# Patient Record
Sex: FEMALE | Race: NATIVE HAWAIIAN OR OTHER PACIFIC ISLANDER | HISPANIC OR LATINO | Employment: UNEMPLOYED | URBAN - METROPOLITAN AREA
[De-identification: names, ages, dates, MRNs, and addresses within clinical notes are randomized per-mention and may not be internally consistent; named-entity substitution may affect disease eponyms.]

---

## 2020-04-06 ENCOUNTER — APPOINTMENT (EMERGENCY)
Dept: RADIOLOGY | Facility: HOSPITAL | Age: 35
End: 2020-04-06
Payer: OTHER GOVERNMENT

## 2020-04-06 ENCOUNTER — HOSPITAL ENCOUNTER (EMERGENCY)
Facility: HOSPITAL | Age: 35
Discharge: HOME | End: 2020-04-06
Attending: EMERGENCY MEDICINE | Admitting: EMERGENCY MEDICINE
Payer: OTHER GOVERNMENT

## 2020-04-06 VITALS
DIASTOLIC BLOOD PRESSURE: 85 MMHG | RESPIRATION RATE: 18 BRPM | TEMPERATURE: 99.8 F | SYSTOLIC BLOOD PRESSURE: 143 MMHG | HEART RATE: 85 BPM | OXYGEN SATURATION: 99 %

## 2020-04-06 DIAGNOSIS — Z20.822 SUSPECTED COVID-19 VIRUS INFECTION: ICD-10-CM

## 2020-04-06 DIAGNOSIS — B34.9 ACUTE VIRAL SYNDROME: Primary | ICD-10-CM

## 2020-04-06 PROCEDURE — 99283 EMERGENCY DEPT VISIT LOW MDM: CPT

## 2020-04-06 PROCEDURE — 87635 SARS-COV-2 COVID-19 AMP PRB: CPT | Performed by: EMERGENCY MEDICINE

## 2020-04-06 PROCEDURE — 71045 X-RAY EXAM CHEST 1 VIEW: CPT

## 2020-04-06 PROCEDURE — 99284 EMERGENCY DEPT VISIT MOD MDM: CPT | Performed by: EMERGENCY MEDICINE

## 2020-04-06 RX ORDER — AZITHROMYCIN 250 MG/1
TABLET, FILM COATED ORAL
Qty: 6 TABLET | Refills: 0 | Status: SHIPPED | OUTPATIENT
Start: 2020-04-06 | End: 2020-04-10

## 2020-04-06 RX ORDER — DEXTROMETHORPHAN HYDROBROMIDE AND PROMETHAZINE HYDROCHLORIDE 15; 6.25 MG/5ML; MG/5ML
5 SOLUTION ORAL 4 TIMES DAILY PRN
Qty: 118 ML | Refills: 0 | Status: SHIPPED | OUTPATIENT
Start: 2020-04-06

## 2020-04-08 LAB — SARS-COV-2 RNA SPEC QL NAA+PROBE: DETECTED

## 2022-04-13 ENCOUNTER — HOSPITAL ENCOUNTER (EMERGENCY)
Facility: HOSPITAL | Age: 37
Discharge: HOME | End: 2022-04-13
Attending: INTERNAL MEDICINE

## 2022-04-13 VITALS
TEMPERATURE: 98.7 F | OXYGEN SATURATION: 100 % | SYSTOLIC BLOOD PRESSURE: 144 MMHG | DIASTOLIC BLOOD PRESSURE: 87 MMHG | HEART RATE: 87 BPM | RESPIRATION RATE: 16 BRPM

## 2022-04-13 DIAGNOSIS — T78.40XA ALLERGIC REACTION, INITIAL ENCOUNTER: ICD-10-CM
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
## 2022-04-13 PROCEDURE — 99284 EMERGENCY DEPT VISIT MOD MDM: CPT | Performed by: PHYSICIAN ASSISTANT

## 2022-04-13 PROCEDURE — 99281 EMR DPT VST MAYX REQ PHY/QHP: CPT

## 2022-04-13 RX ORDER — DIPHENHYDRAMINE HCL 25 MG
25 TABLET ORAL ONCE
Status: COMPLETED | OUTPATIENT
Start: 2022-04-13 | End: 2022-04-13

## 2022-04-13 RX ORDER — PREDNISONE 20 MG/1
40 TABLET ORAL DAILY
Qty: 8 TABLET | Refills: 0 | Status: SHIPPED | OUTPATIENT
Start: 2022-04-13 | End: 2022-04-17

## 2022-04-13 RX ORDER — PREDNISONE 20 MG/1
40 TABLET ORAL ONCE
Status: COMPLETED | OUTPATIENT
Start: 2022-04-13 | End: 2022-04-13

## 2022-04-13 RX ADMIN — PREDNISONE 40 MG: 20 TABLET ORAL at 14:51

## 2022-04-13 RX ADMIN — DIPHENHYDRAMINE HYDROCHLORIDE 25 MG: 25 TABLET ORAL at 14:50

## 2022-04-13 NOTE — DISCHARGE INSTRUCTIONS
Take Benadryl 25-50mg every 6 hours as needed for itching, swelling  Take all oral steroids until done    Ice to area to help with swelling

## 2022-04-13 NOTE — Clinical Note
Alcides Flannery was seen and treated in our emergency department on 4/13/2022  No restrictions            Diagnosis: multiple insect bites, allergic reaction    Michaelle Adolph  may return to work on return date  She may return on this date: 04/15/2022         If you have any questions or concerns, please don't hesitate to call        Elisa Olsen PA-C    ______________________________           _______________          _______________  Hospital Representative                              Date                                Time

## 2022-04-13 NOTE — ED PROVIDER NOTES
History  Chief Complaint   Patient presents with   Arnold Lazaro 85     pt presents to Ed with insect bite on left hand, reports no pain, redness and swelling near pinky, two bites on thumb      Patient no PMH, no PSH presents to ED after insect bites to left hand that patient sustained yesterday, did not see specific insect, concerned for possible spider bite  Patient states first noticed bites yesterday and today she was working, moving hand, noticed increased pain/redness/swelling so came to be evaluated  Patient is not using anything for symptoms  No chest pain, shortness of breath, no NV, no throat closing          Prior to Admission Medications   Prescriptions Last Dose Informant Patient Reported? Taking? Promethazine-DM (PHENERGAN-DM) 6 25-15 mg/5 mL oral syrup   No No   Sig: Take 5 mL by mouth 4 (four) times a day as needed for cough      Facility-Administered Medications: None       History reviewed  No pertinent past medical history  History reviewed  No pertinent surgical history  History reviewed  No pertinent family history  I have reviewed and agree with the history as documented  E-Cigarette/Vaping    E-Cigarette Use Never User      E-Cigarette/Vaping Substances     Social History     Tobacco Use    Smoking status: Never Smoker    Smokeless tobacco: Never Used   Vaping Use    Vaping Use: Never used   Substance Use Topics    Alcohol use: Never    Drug use: Never       Review of Systems   Constitutional: Negative for chills and fever  HENT: Negative for hearing loss, rhinorrhea, sore throat and trouble swallowing  Eyes: Negative for visual disturbance  Respiratory: Negative for cough and shortness of breath  Cardiovascular: Negative for chest pain  Gastrointestinal: Negative for nausea and vomiting  Musculoskeletal: Negative for arthralgias and myalgias  Skin: Positive for color change and rash  Negative for pallor  Neurological: Negative for dizziness and numbness  Psychiatric/Behavioral: Negative for behavioral problems  All other systems reviewed and are negative  Physical Exam  Physical Exam  Vitals and nursing note reviewed  Constitutional:       General: She is not in acute distress  Appearance: Normal appearance  She is well-developed  HENT:      Head: Normocephalic and atraumatic  Right Ear: External ear normal       Left Ear: External ear normal       Nose: Nose normal       Mouth/Throat:      Mouth: Mucous membranes are moist       Pharynx: Oropharynx is clear  No posterior oropharyngeal erythema  Eyes:      Conjunctiva/sclera: Conjunctivae normal    Cardiovascular:      Rate and Rhythm: Normal rate  Pulmonary:      Effort: Pulmonary effort is normal  No respiratory distress  Breath sounds: Normal breath sounds  No wheezing  Musculoskeletal:         General: Normal range of motion  Cervical back: Normal range of motion  Lymphadenopathy:      Cervical: No cervical adenopathy  Skin:     General: Skin is warm and dry  Findings: Erythema, lesion and rash present  Comments: + #3 tiny, slightly raised punctures noted to left, medial dorsal hand and thumb cw insect envenomation with surrounding swelling, erythema, likely local reaction  See pic below   Neurological:      Mental Status: She is alert and oriented to person, place, and time     Psychiatric:         Behavior: Behavior normal              Vital Signs  ED Triage Vitals [04/13/22 1435]   Temperature Pulse Respirations Blood Pressure SpO2   98 7 °F (37 1 °C) 87 16 144/87 100 %      Temp Source Heart Rate Source Patient Position - Orthostatic VS BP Location FiO2 (%)   Oral Monitor Sitting Left arm --      Pain Score       --           Vitals:    04/13/22 1435   BP: 144/87   Pulse: 87   Patient Position - Orthostatic VS: Sitting         Visual Acuity      ED Medications  Medications   diphenhydrAMINE (BENADRYL) tablet 25 mg (25 mg Oral Given 4/13/22 1450) predniSONE tablet 40 mg (40 mg Oral Given 4/13/22 4971)       Diagnostic Studies  Results Reviewed     None                 No orders to display              Procedures  Procedures         ED Course                                             MDM    Disposition  Final diagnoses:   Multiple insect bites   Allergic reaction, initial encounter     Time reflects when diagnosis was documented in both MDM as applicable and the Disposition within this note     Time User Action Codes Description Comment    4/13/2022  2:47 PM Lori Sachs  XXXA] Multiple insect bites     4/13/2022  2:48 PM Jilda Smoke Add [T78 40XA] Allergic reaction, initial encounter       ED Disposition     ED Disposition Condition Date/Time Comment    Discharge Stable Wed Apr 13, 2022  2:47 PM Carlitos Mcarthur discharge to home/self care  Follow-up Information    None         Patient's Medications   Discharge Prescriptions    PREDNISONE 20 MG TABLET    Take 2 tablets (40 mg total) by mouth daily for 4 days       Start Date: 4/13/2022 End Date: 4/17/2022       Order Dose: 40 mg       Quantity: 8 tablet    Refills: 0       No discharge procedures on file      PDMP Review     None          ED Provider  Electronically Signed by           Delgado Mohamud PA-C  04/13/22 5040

## 2022-04-13 NOTE — ED NOTES
Discharge instructions reviewed with pt  Pt verbalized understanding  And has no further questions at this time        Sarita Ibarra, RN  04/13/22 4450

## 2023-03-14 ENCOUNTER — OFFICE VISIT (OUTPATIENT)
Dept: OBGYN CLINIC | Facility: CLINIC | Age: 38
End: 2023-03-14

## 2023-03-14 VITALS
SYSTOLIC BLOOD PRESSURE: 134 MMHG | HEIGHT: 59 IN | WEIGHT: 140 LBS | BODY MASS INDEX: 28.22 KG/M2 | RESPIRATION RATE: 18 BRPM | HEART RATE: 71 BPM | DIASTOLIC BLOOD PRESSURE: 79 MMHG

## 2023-03-14 DIAGNOSIS — Z32.02 PREGNANCY TEST NEGATIVE: ICD-10-CM

## 2023-03-14 DIAGNOSIS — Z30.41 ENCOUNTER FOR SURVEILLANCE OF CONTRACEPTIVE PILLS: Primary | ICD-10-CM

## 2023-03-14 DIAGNOSIS — Z78.9 LANGUAGE BARRIER: ICD-10-CM

## 2023-03-14 PROBLEM — Z75.8 LANGUAGE BARRIER: Status: ACTIVE | Noted: 2023-03-14

## 2023-03-14 PROBLEM — Z60.3 LANGUAGE BARRIER: Status: ACTIVE | Noted: 2023-03-14

## 2023-03-14 LAB — SL AMB POCT URINE HCG: NORMAL

## 2023-03-14 RX ORDER — NORETHINDRONE ACETATE AND ETHINYL ESTRADIOL 1MG-20(21)
1 KIT ORAL DAILY
Qty: 28 TABLET | Refills: 2 | Status: SHIPPED | OUTPATIENT
Start: 2023-03-14 | End: 2024-05-13

## 2023-03-14 RX ORDER — NORETHINDRONE ACETATE AND ETHINYL ESTRADIOL 1MG-20(21)
1 KIT ORAL DAILY
COMMUNITY
Start: 2022-11-23 | End: 2023-03-14 | Stop reason: SDUPTHER

## 2023-03-14 NOTE — PROGRESS NOTES
Assessment/Plan:    No problem-specific Assessment & Plan notes found for this encounter  Diagnoses and all orders for this visit:    Encounter for surveillance of contraceptive pills  -     norethindrone-ethinyl estradiol (JUNEL FE 1/20) 1-20 MG-MCG per tablet; Take 1 tablet by mouth daily    Pregnancy test negative  -     POCT urine HCG    Language barrier    Other orders  -     betamethasone valerate (VALISONE) 0 1 % ointment; Apply 1 application  topically 2 (two) times a day  -     Discontinue: norethindrone-ethinyl estradiol (JUNEL FE 1/20) 1-20 MG-MCG per tablet; Take 1 tablet by mouth daily          Subjective:      Patient ID: Ju Corona is a 41 y o  female  HPI 42-year-old for birth control consult, she is a new patient to us  [place] Interpretation by [id number]  She is on Junel Fe 1/20,   Her prescription previously was filled through physician [facility]  She denies any problems with use, denies any [facility] ES  Her LMP was 2/28/23, menses are regular , monthly x 4-5 days  HX of heart murmur that resolved 20 years ago  She states she has allergy/activity to adrenaline she gets tachycardic  Last Pap was 9/23/2021 was negative with negative high-risk HPV but no cervical cells  She will return to office for annual GYN exam    The following portions of the patient's history were reviewed and updated as appropriate: allergies, current medications, past family history, past medical history, past social history, past surgical history and problem list     Review of Systems   Constitutional: Negative for chills and fever  Eyes: Negative for photophobia and visual disturbance  Respiratory: Negative  Cardiovascular: Negative  Gastrointestinal: Negative  Genitourinary: Negative for menstrual problem  Neurological: Negative for dizziness and headaches           Objective:      /79 (BP Location: Right arm, Patient Position: Sitting, Cuff Size: Adult)   Pulse 71   Resp 18   Ht 4' 11 06" (1 5 m)   Wt 63 5 kg (140 lb)   LMP 02/28/2023 (Exact Date)   BMI 28 22 kg/m²          Physical Exam  Constitutional:       Appearance: Normal appearance  Cardiovascular:      Rate and Rhythm: Normal rate and regular rhythm  Pulmonary:      Effort: Pulmonary effort is normal       Breath sounds: Normal breath sounds  Abdominal:      Palpations: Abdomen is soft  Tenderness: There is no abdominal tenderness  Skin:     General: Skin is warm and dry  Neurological:      Mental Status: She is oriented to person, place, and time     Psychiatric:         Mood and Affect: Mood normal          Behavior: Behavior normal

## 2023-04-14 PROBLEM — R01.1 MURMUR, HEART: Status: ACTIVE | Noted: 2023-04-14

## 2023-04-14 PROBLEM — R53.83 OTHER FATIGUE: Status: ACTIVE | Noted: 2023-04-14

## 2023-04-14 PROBLEM — M25.512 ACUTE PAIN OF LEFT SHOULDER: Status: ACTIVE | Noted: 2023-04-14

## 2023-04-14 PROBLEM — Z00.00 ANNUAL PHYSICAL EXAM: Status: ACTIVE | Noted: 2023-04-14

## 2023-04-14 PROBLEM — R51.9 HEADACHE: Status: ACTIVE | Noted: 2023-04-14

## 2023-04-26 ENCOUNTER — PATIENT OUTREACH (OUTPATIENT)
Dept: FAMILY MEDICINE CLINIC | Facility: CLINIC | Age: 38
End: 2023-04-26

## 2023-04-26 NOTE — LETTER
04/26/23    Estimado/a Hamida Menchacases Dick Adam un coordinador de la atención [place] en [facility] [address]  [address] [place] [address] [phone]    Intentamos comunicarnos con usted por teléfono varias veces  Es importante que se comunique con nosotros al Social Work Outpatient Care Management, [phone], para que podamos ofrecerle ayuda con estevan necesidades de [employer]  Atentamente           CHRIS Campos  Social Work Outpatient Care Manager

## 2023-04-26 NOTE — PROGRESS NOTES
THAIS Aguilar attempted second outreach to Pt this day to follow-up on discussion r/t financial assistance for medical needs  THAIS AGUILAR has [facility] application and would like to discuss applying for same with Pt to see if Pt would qualify for medical coverage  Call placed to Pt this day via [employer] ( #[id number])  However, Pt did not answer  VM left requesting a return call to follow-up on supports r/t financial assistance for Pt's medical needs  UTR letter to be sent to Pt this day d/t lack of response  THAIS AGUILAR will assess need to close referral if no response is received from Pt  THAIS AGUILAR will continue to follow and assist PRN

## 2023-05-10 ENCOUNTER — PATIENT OUTREACH (OUTPATIENT)
Dept: FAMILY MEDICINE CLINIC | Facility: CLINIC | Age: 38
End: 2023-05-10

## 2023-05-10 NOTE — PROGRESS NOTES
THAIS AGUILAR has made multiple attempts to outreach Pt to follow-up on Pt's need for financial assistance  Outreach attempts completed on 4/18/23, 4/26/23 and UTR letter sent 4/26/23 all without response  THAIS AGUILAR to close referral at this time d/t lack of response  However, THAIS AGUILAR will remain available for future needs or referrals for Pt

## 2023-06-23 ENCOUNTER — PATIENT OUTREACH (OUTPATIENT)
Dept: FAMILY MEDICINE CLINIC | Facility: CLINIC | Age: 38
End: 2023-06-23

## 2023-06-23 NOTE — PROGRESS NOTES
THAIS AGUILAR received VM from Pt this day in regards to the previous outreach attempts made by THAIS PLASCENCIA CM was not available at time of call from Pt, but did receive Pt's VM  THAIS AGUILAR had been working to follow-up on Pt's need for financial assistance  Pt is without insurance and does not qualify for MA at this time  However, Pt does currently have SFS  THAIS AGUILAR would like to office Pt application for [facility] and provide contact information for [facility] r/t same  Call placed to Pt this day via [employer] ( #[id number]) with success  THAIS AGUILAR reviewed previous outreach attempts and that same was r/t wanting to provide [address] application and contact information for [facility]  Pt expressed understanding and in agreement with receiving information r/t same  THAIS AGUILAR confirmed address on file at this time so that [facility] application can be mailed to Pt  Pt satisfied with same  Application mailed to Pt this day  All other questions addressed at this time and satisfied  THAIS AGUILAR will continue to follow and assist PRN

## 2023-07-17 DIAGNOSIS — Z30.41 ENCOUNTER FOR SURVEILLANCE OF CONTRACEPTIVE PILLS: ICD-10-CM

## 2023-07-19 RX ORDER — NORETHINDRONE ACETATE AND ETHINYL ESTRADIOL AND FERROUS FUMARATE 1MG-20(21)
KIT ORAL
Qty: 84 TABLET | Refills: 0 | Status: SHIPPED | OUTPATIENT
Start: 2023-07-19

## 2023-07-19 NOTE — TELEPHONE ENCOUNTER
ID [id number]    Attempted to call, left a message asking for patient to call the office regarding an appointment.  When patient calls back, please schedule an annual visit with CHRISTIANO

## 2023-08-04 ENCOUNTER — PATIENT OUTREACH (OUTPATIENT)
Dept: FAMILY MEDICINE CLINIC | Facility: CLINIC | Age: 38
End: 2023-08-04

## 2023-08-04 NOTE — PROGRESS NOTES
OP THAISCM received message from clerical that pt arrived at office for an appt and wanted to discuss [facility] application. Chart review completed. Per chart review, pt had been working with OP VASQUEZ Eduardo in regards to financial assistance. Per chart, pt does not qualify for MA at this time and does not have insurance. Per chart, pt does have SFS and has worked with [facility] in the past. Per chart, pt was agreeable to [facility] sending [facility] application via mail and [facility] had mailed it on 6/23. Per message from clerical, pt is inquiring about the application and has not heard anything back from [facility]. OP SWCM used  line to call pt. OP SWCM left VM asking for pt to call back to discuss [facility] application and financial assistance.

## 2023-08-21 ENCOUNTER — PATIENT OUTREACH (OUTPATIENT)
Dept: FAMILY MEDICINE CLINIC | Facility: CLINIC | Age: 38
End: 2023-08-21

## 2023-08-21 NOTE — PROGRESS NOTES
Pt called OP SWCM and left VM as OP SWCM was unavailable to take call at that time. Pt left VM asking for call back regarding financial assistance needed. Chart review completed. Per chart review, pt needing assistance regarding [facility] application. Pt is Togolese speaking. Language line used.  #[id number]. OP SWCM called pt and left VM introducing self, role and reason for calling. OP SWCM asked for pt to return call to discuss financial assistance needs. OP SWCM will await a call back and f/u as needed.

## 2023-08-22 ENCOUNTER — PATIENT OUTREACH (OUTPATIENT)
Dept: FAMILY MEDICINE CLINIC | Facility: CLINIC | Age: 38
End: 2023-08-22

## 2023-08-22 NOTE — PROGRESS NOTES
Incoming call from pt received. OP SWCM unable to take call at the time so pt left VM. Pt Eritrean speaking. OP VASQUEZ with [address] assistance called pt and spoke to her. Pt states she never received the [facility] application that [id number] [facility] [place] had mailed out to her 6/23. OP VASQUEZ and CMOC Susan explained process with pt of [facility] application and items needed for application to be complete. [facility] application to be mailed out today. [address] offered to  application once completed and send it over the correct person.

## 2024-05-17 ENCOUNTER — ANNUAL EXAM (OUTPATIENT)
Dept: FAMILY MEDICINE CLINIC | Facility: CLINIC | Age: 39
End: 2024-05-17

## 2024-05-17 VITALS
HEART RATE: 65 BPM | TEMPERATURE: 98.4 F | WEIGHT: 132.6 LBS | OXYGEN SATURATION: 98 % | HEIGHT: 57 IN | BODY MASS INDEX: 28.61 KG/M2 | DIASTOLIC BLOOD PRESSURE: 75 MMHG | SYSTOLIC BLOOD PRESSURE: 111 MMHG | RESPIRATION RATE: 18 BRPM

## 2024-05-17 DIAGNOSIS — H61.23 BILATERAL IMPACTED CERUMEN: ICD-10-CM

## 2024-05-17 DIAGNOSIS — Z59.9 FINANCIAL DIFFICULTIES: ICD-10-CM

## 2024-05-17 DIAGNOSIS — Z13.220 SCREENING FOR LIPID DISORDERS: ICD-10-CM

## 2024-05-17 DIAGNOSIS — Z00.00 ANNUAL PHYSICAL EXAM: Primary | ICD-10-CM

## 2024-05-17 DIAGNOSIS — Z11.59 ENCOUNTER FOR HEPATITIS C SCREENING TEST FOR LOW RISK PATIENT: ICD-10-CM

## 2024-05-17 DIAGNOSIS — Z11.4 SCREENING FOR HIV (HUMAN IMMUNODEFICIENCY VIRUS): ICD-10-CM

## 2024-05-17 PROCEDURE — 99395 PREV VISIT EST AGE 18-39: CPT | Performed by: FAMILY MEDICINE

## 2024-05-17 SDOH — ECONOMIC STABILITY - INCOME SECURITY: PROBLEM RELATED TO HOUSING AND ECONOMIC CIRCUMSTANCES, UNSPECIFIED: Z59.9

## 2024-05-17 NOTE — ASSESSMENT & PLAN NOTE
Bilateral impacted cerumen  Right greater than left  Periodic symptoms of ear pain usually in the morning and left ear but resolve as the day goes on  No signs of infection at this time    Plan:  10 Debrox drops each ear for 1-2 week, patient instructed to return to clinic for bilateral cerumen disimpaction.

## 2024-05-17 NOTE — PROGRESS NOTES
"Adult Annual Physical  Name: Chani Foster      : 1985      MRN: [id number]  Encounter Provider: Sharon Coates DO  Encounter Date: 2024   [facility]    Assessment & Plan   1. Annual physical exam  2. Bilateral impacted cerumen  Assessment & Plan:  Bilateral impacted cerumen  Right greater than left  Periodic symptoms of ear pain usually in the morning and left ear but resolve as the day goes on  No signs of infection at this time    Plan:  10 Debrox drops each ear for 1-2 week, patient instructed to return to clinic for bilateral cerumen disimpaction.  Orders:  -     carbamide peroxide (DEBROX) 6.5 % otic solution; Administer 10 drops into both ears 2 (two) times a day  3. Financial difficulties  -     Ambulatory referral to social work care management program; Future; Expected date: 2024  4. Encounter for hepatitis C screening test for low risk patient  -     Hepatitis C antibody; Future  5. Screening for HIV (human immunodeficiency virus)  -     HIV 1/2 AG/AB w Reflex [facility] for 2 yr old and above; Future  6. Screening for lipid disorders  -     Lipid Panel with Direct LDL reflex; Future  7. BMI 29.0-29.9,adult  -     Hemoglobin A1C; Future    Immunizations and preventive care screenings were discussed with patient today. Appropriate education was printed on patient's after visit summary.  39 y.o. female here for annual physical exam.     Immunization:  - COVID -- 2021, 2021,     Screening: I have discussed each screening test below (per USPSTF guideline) with patient, to which patient expresses understanding and is agreeable to plan  - Obesity -- Negative   - Hypertension -- Negative   - Nicotine/EtOH/Drugs -- Negative   - Depression -- Negative   Depression: Not at risk (2024)    PHQ-2     PHQ-2 Score: 2     - HIV -- Ordered   - Hep C -- Ordered   No results found for: \"HIVAGAB\", \"HEPCAB\"  - T2DM -- " "Ordered.  No results found for: \"HGBA1C\"  Lab Results   Component Value Date    CREATININE 0.87 06/15/2022     - Breast cancer -- Not indicated     Screening: (female specific):  - Cervical cancer -  indicated, patient follows with OBGYN    No results found for: \"CASEREPORT\", \"PRIMINTERP\", \"INTERPGYN\", \"SPECADGYN\", \"HPVOTHERHR\", \"HPV16\", \"HPV18\"  - GC/Chlam -- Deferred by patient   - Folic acid -- Not indicated   - Intimate partner violence -- Negative       Counseling:  Alcohol/drug use: discussed moderation in alcohol intake, the recommendations for healthy alcohol use, and avoidance of illicit drug use.  Dental Health: discussed importance of regular tooth brushing, flossing, and dental visits.  Injury prevention: discussed safety/seat belts, safety helmets, smoke detectors, carbon dioxide detectors, and smoking near bedding or upholstery.  Sexual health: discussed sexually transmitted diseases, partner selection, use of condoms, avoidance of unintended pregnancy, and contraceptive alternatives.  Exercise: the importance of regular exercise/physical activity was discussed. Recommend exercise 3-5 times per week for at least 30 minutes.     BMI Counseling: Body mass index is 29 kg/m². The BMI is above normal. Nutrition recommendations include encouraging healthy choices of fruits and vegetables, decreasing fast food intake, consuming healthier snacks, limiting drinks that contain sugar, moderation in carbohydrate intake and increasing intake of lean protein. Exercise recommendations include moderate physical activity 150 minutes/week. No pharmacotherapy was ordered. Rationale for BMI follow-up plan is due to patient being overweight or obese.     Depression Screening and Follow-up Plan: Patient was screened for depression during today's encounter. They screened negative with a PHQ-2 score of 2.        History of Present Illness     Adult Annual Physical:  Patient presents for annual physical.     Diet and Physical " "Activity:  - Diet/Nutrition: well balanced diet.  - Exercise: no formal exercise.    Depression Screening:  - PHQ-2 Score: 2    General Health:  - Sleep: 4-6 hours of sleep on average, snores loudly and unrefreshing sleep. sometimes  - Hearing: normal hearing right ear and normal hearing left ear.  - Vision: no vision problems.  - Dental: regular dental visits and brushes teeth twice daily.    /GYN Health:  - Follows with GYN: yes.   - Menopause: premenopausal.   - Last menstrual cycle: 5/1/2024.   - History of STDs: no  - Contraception: barrier methods.      Advanced Care Planning:  - Has an advanced directive?: no    - Has a durable medical POA?: no    - ACP document given to patient?: no      Review of Systems   Constitutional:  Negative for chills and fever.   HENT:  Positive for ear pain.    Eyes:  Negative for visual disturbance.   Respiratory:  Negative for cough and shortness of breath.    Cardiovascular:  Negative for chest pain and palpitations.   Gastrointestinal:  Negative for abdominal pain and vomiting.   Genitourinary:  Negative for dysuria and hematuria.   Musculoskeletal:  Positive for arthralgias. Negative for back pain.   Skin:  Negative for color change and rash.   Neurological:  Negative for dizziness and headaches.   All other systems reviewed and are negative.        Objective     /75 (BP Location: Left arm, Patient Position: Sitting, Cuff Size: Standard)   Pulse 65   Temp 98.4 °F (36.9 °C) (Temporal)   Resp 18   Ht 4' 8.7\" (1.44 m)   Wt 60.1 kg (132 lb 9.6 oz)   LMP 05/01/2024 (Exact Date)   SpO2 98%   BMI 29.00 kg/m²     Physical Exam  Vitals and nursing note reviewed.   Constitutional:       General: She is not in acute distress.     Appearance: She is well-developed.   HENT:      Head: Normocephalic and atraumatic.      Right Ear: Tympanic membrane, ear canal and external ear normal. There is impacted cerumen.      Left Ear: Tympanic membrane, ear canal and external ear " normal. There is impacted cerumen.   Eyes:      Conjunctiva/sclera: Conjunctivae normal.   Cardiovascular:      Rate and Rhythm: Normal rate and regular rhythm.      Heart sounds: No murmur heard.     No friction rub. No gallop.   Pulmonary:      Effort: Pulmonary effort is normal. No respiratory distress.      Breath sounds: Normal breath sounds. No wheezing, rhonchi or rales.   Abdominal:      Palpations: Abdomen is soft.      Tenderness: There is no abdominal tenderness.   Musculoskeletal:         General: No swelling.      Cervical back: Neck supple.      Right lower leg: No edema.      Left lower leg: No edema.   Skin:     General: Skin is warm and dry.      Capillary Refill: Capillary refill takes less than 2 seconds.   Neurological:      Mental Status: She is alert.   Psychiatric:         Mood and Affect: Mood normal.       Administrative Statements

## 2024-05-24 ENCOUNTER — PATIENT OUTREACH (OUTPATIENT)
Dept: FAMILY MEDICINE CLINIC | Facility: CLINIC | Age: 39
End: 2024-05-24

## 2024-05-24 ENCOUNTER — APPOINTMENT (OUTPATIENT)
Dept: LAB | Facility: CLINIC | Age: 39
End: 2024-05-24

## 2024-05-24 DIAGNOSIS — Z11.59 ENCOUNTER FOR HEPATITIS C SCREENING TEST FOR LOW RISK PATIENT: ICD-10-CM

## 2024-05-24 DIAGNOSIS — Z13.220 SCREENING FOR LIPID DISORDERS: ICD-10-CM

## 2024-05-24 DIAGNOSIS — Z11.4 SCREENING FOR HIV (HUMAN IMMUNODEFICIENCY VIRUS): ICD-10-CM

## 2024-05-24 LAB
CHOLEST SERPL-MCNC: 181 MG/DL
EST. AVERAGE GLUCOSE BLD GHB EST-MCNC: 103 MG/DL
HBA1C MFR BLD: 5.2 %
HCV AB SER QL: NORMAL
HDLC SERPL-MCNC: 55 MG/DL
HIV 1+2 AB+HIV1 P24 AG SERPL QL IA: NORMAL
HIV 2 AB SERPL QL IA: NORMAL
HIV1 AB SERPL QL IA: NORMAL
HIV1 P24 AG SERPL QL IA: NORMAL
LDLC SERPL CALC-MCNC: 102 MG/DL (ref 0–100)
TRIGL SERPL-MCNC: 121 MG/DL

## 2024-05-24 PROCEDURE — 83036 HEMOGLOBIN GLYCOSYLATED A1C: CPT

## 2024-05-24 PROCEDURE — 86803 HEPATITIS C AB TEST: CPT

## 2024-05-24 PROCEDURE — 87389 HIV-1 AG W/HIV-1&-2 AB AG IA: CPT

## 2024-05-24 PROCEDURE — 80061 LIPID PANEL: CPT

## 2024-05-24 PROCEDURE — 36415 COLL VENOUS BLD VENIPUNCTURE: CPT

## 2024-05-24 NOTE — PROGRESS NOTES
OP SWCM received referral from Dr. Aminata DO on 5/17 r/t financial difficulties and [facility] needs. Chart review completed. Per chart review, pt w/o health insurance. Per chart review, pt reported somewhat difficult to afford very basics. Per chart review, pt w/ previous SW outreach attempts to assist w/ applying for [facility]. Per chart review, pt approved for SFS through [facility] through 4/8/2025.    Pt is Setswana speaking. OP SWCM called pt using [employer]  #[id number]. OP SWCM received pt's VM and left VM introducing self, role and asked for pt to return call to discuss referral. OP SWCM will await a call back and f/u as needed.    Addendum:  OP SWCM received call back from pt. Pt asked for SW to call back later w/ . SWCM will call back with a .

## 2024-05-29 ENCOUNTER — ANNUAL EXAM (OUTPATIENT)
Dept: OBGYN CLINIC | Facility: CLINIC | Age: 39
End: 2024-05-29

## 2024-05-29 ENCOUNTER — PATIENT OUTREACH (OUTPATIENT)
Dept: OBGYN CLINIC | Facility: CLINIC | Age: 39
End: 2024-05-29

## 2024-05-29 VITALS
WEIGHT: 130.8 LBS | BODY MASS INDEX: 28.22 KG/M2 | DIASTOLIC BLOOD PRESSURE: 78 MMHG | HEART RATE: 57 BPM | SYSTOLIC BLOOD PRESSURE: 118 MMHG | HEIGHT: 57 IN

## 2024-05-29 DIAGNOSIS — Z20.2 POSSIBLE EXPOSURE TO SEXUALLY TRANSMITTED INFECTION: ICD-10-CM

## 2024-05-29 DIAGNOSIS — Z01.419 ENCOUNTER FOR GYNECOLOGICAL EXAMINATION WITHOUT ABNORMAL FINDING: Primary | ICD-10-CM

## 2024-05-29 DIAGNOSIS — Z13.31 POSITIVE DEPRESSION SCREENING: ICD-10-CM

## 2024-05-29 DIAGNOSIS — Z60.3 LANGUAGE BARRIER: ICD-10-CM

## 2024-05-29 DIAGNOSIS — Z75.8 LANGUAGE BARRIER: ICD-10-CM

## 2024-05-29 PROCEDURE — 87491 CHLMYD TRACH DNA AMP PROBE: CPT | Performed by: NURSE PRACTITIONER

## 2024-05-29 PROCEDURE — 87591 N.GONORRHOEAE DNA AMP PROB: CPT | Performed by: NURSE PRACTITIONER

## 2024-05-29 PROCEDURE — 99395 PREV VISIT EST AGE 18-39: CPT | Performed by: NURSE PRACTITIONER

## 2024-05-29 SDOH — SOCIAL STABILITY - SOCIAL INSECURITY: ACCULTURATION DIFFICULTY: Z60.3

## 2024-05-29 NOTE — PROGRESS NOTES
"THAIS AGUILAR received a referral for depression. Pt is a 38yo Single  female primary language is Albanian and PHQ9 score 15.    Pt reports that 4 months ago she lost her father and it has impacted her a lot emotionally. Pt reports that she is confident she will be ok and states \"I know the first year will be hard\" Pt reports to have her mother and sisters to talk to as a support and denies a need for additional support or therapy.     THAIS AGUILAR encouraged pt to contact the THAIS  if needed. THAIS AGUILAR will otherwise close this referral at this time.   "

## 2024-05-29 NOTE — PROGRESS NOTES
ASSESSMENT & PLAN: Chani Foster is a 39 y.o.  with normal gynecologic exam.    1.  Routine well woman exam done today  2.  Pap and HPV:  The patient's last pap and hpv was 2021.    It was normal.    Pap and cotesting was not done today.    Current ASCCP Guidelines reviewed. Due 26  3.  The following were reviewed in today's visit: breast self exam, STD testing, family planning choices, adequate intake of calcium and vitamin D, exercise, and healthy diet.  She had STD testing that was negative for hepatitis C and HIV 2024.  She consents to testing for chlamydia and gonorrhea culture today  4. Gardisil vaccine in women up to age 45 discussed and information was provided.    5. SWCM for positive depression screen, recent passing of her father    BMI Counseling: Body mass index is 28.61 kg/m². The BMI is above normal. Nutrition recommendations include encouraging healthy choices of fruits and vegetables and moderation in carbohydrate intake. Exercise recommendations include exercising 3-5 times per week. No pharmacotherapy was ordered. Rationale for BMI follow-up plan is due to patient being overweight or obese.     Depression Screening and Follow-up Plan: Patient's depression screening was positive with a PHQ-2 score of 4. Their PHQ-9 score was 15.         CC:  Annual Gynecologic Examination    HPI: Chani Foster is a 39 y.o.  who presents for annual gynecologic examination.   [id number] .   Health Maintenance:    She wears her seatbelt routinely.    She does perform regular monthly self breast exams.    She feels safe at home.     Past Medical History:   Diagnosis Date    Heart murmur     Diagnosed by provider in [place]. Patient was born with it.       Past Surgical History:   Procedure Laterality Date    CARDIAC CATHETERIZATION         Past OB/Gyn History:  OB History          0    Para   0    Term   0       0    AB   0     Living   0         SAB   0    IAB   0    Ectopic   0    Multiple   0    Live Births   0               Pt does not have menstrual issues. Monthly  x4-5 days   History of sexually transmitted infection: No.  History of abnormal pap smears: No .    Patient is not currently sexually active.  not sexually active.  The current method of family planning is none.    Family History   Problem Relation Age of Onset    Thyroid disease Mother     Lung disease Father     No Known Problems Sister     No Known Problems Sister     Throat cancer Maternal Grandmother     Cancer Maternal Grandfather     Breast cancer Neg Hx     Colon cancer Neg Hx     Ovarian cancer Neg Hx        Social History:  Social History     Socioeconomic History    Marital status: Single     Spouse name: Not on file    Number of children: Not on file    Years of education: Not on file    Highest education level: Not on file   Occupational History    Not on file   Tobacco Use    Smoking status: Never    Smokeless tobacco: Never   Vaping Use    Vaping status: Never Used   Substance and Sexual Activity    Alcohol use: Not Currently    Drug use: Never    Sexual activity: Not Currently     Partners: Male     Birth control/protection: Condom Male   Other Topics Concern    Not on file   Social History Narrative    Not on file     Social Determinants of Health     Financial Resource Strain: Medium Risk (5/17/2024)    Overall Financial Resource Strain (CARDIA)     Difficulty of Paying Living Expenses: Somewhat hard   Food Insecurity: No Food Insecurity (5/17/2024)    Hunger Vital Sign     Worried About Running Out of Food in the Last Year: Never true     Ran Out of Food in the Last Year: Never true   Transportation Needs: No Transportation Needs (5/17/2024)    PRAPARE - Transportation     Lack of Transportation (Medical): No     Lack of Transportation (Non-Medical): No   Physical Activity: Inactive (5/17/2024)    Exercise Vital Sign     Days of Exercise per Week: 0 days      Minutes of Exercise per Session: 0 min   Stress: No Stress Concern Present (5/17/2024)    [employer] - Occupational Stress Questionnaire     Feeling of Stress : Not at all   Social Connections: Moderately Integrated (5/17/2024)    Social Connection and Isolation Panel [NHANES]     Frequency of Communication with Friends and Family: More than three times a week     Frequency of Social Gatherings with Friends and Family: More than three times a week     Attends Jainism Services: More than 4 times per year     Active Member of Clubs or Organizations: Yes     Attends Club or Organization Meetings: 1 to 4 times per year     Marital Status: Never    Intimate Partner Violence: Not At Risk (5/17/2024)    Humiliation, Afraid, Rape, and Kick questionnaire     Fear of Current or Ex-Partner: No     Emotionally Abused: No     Physically Abused: No     Sexually Abused: No   Housing Stability: Unknown (5/17/2024)    Housing Stability Vital Sign     Unable to Pay for Housing in the Last Year: No     Number of Times Moved in the Last Year: Not on file     Homeless in the Last Year: No     Presently lives with family.  Patient is single.  Patient is currently employed     Allergies   Allergen Reactions    Adrenalin [Epinephrine] Tachycardia         Current Outpatient Medications:     carbamide peroxide (DEBROX) 6.5 % otic solution, Administer 10 drops into both ears 2 (two) times a day, Disp: 15 mL, Rfl: 0    betamethasone valerate (VALISONE) 0.1 % ointment, Apply 1 application. topically 2 (two) times a day (Patient not taking: Reported on 4/14/2023), Disp: , Rfl:     methylPREDNISolone 4 MG tablet therapy pack, Use as directed on package (Patient not taking: Reported on 5/17/2024), Disp: 21 each, Rfl: 0    Promethazine-DM (PHENERGAN-DM) 6.25-15 mg/5 mL oral syrup, Take 5 mL by mouth 4 (four) times a day as needed for cough (Patient not taking: Reported on 4/14/2023), Disp: 118 mL, Rfl:  "0      Review of Systems  Constitutional :no fever, feels well, no tiredness, no recent weight gain or loss  ENT: no ear ache, no loss of hearing, no nosebleeds or nasal discharge, no sore throat or hoarseness.  Cardiovascular: no complaints of slow or fast heart beat, no chest pain, no palpitations, no leg claudication or lower extremity edema.  Respiratory: no complaints of shortness of shortness of breath, no FOLEY  Breasts:no complaints of breast pain, breast lump, or nipple discharge  Gastrointestinal: no complaints of abdominal pain, constipation, nausea, vomiting, or diarrhea or bloody stools  Genitourinary : no complaints of dysuria, incontinence, pelvic pain, no dysmenorrhea, vaginal discharge or abnormal vaginal bleeding and as noted in HPI.  Musculoskeletal: no complaints of arthralgia, no myalgia, no joint swelling or stiffness, no limb pain or swelling.  Integumentary: no complaints of skin rash or lesion, itching or dry skin  Neurological: no complaints of headache, no confusion, no numbness or tingling, no dizziness or fainting    Objective      /78   Pulse 57   Ht 4' 8.69\" (1.44 m)   Wt 59.3 kg (130 lb 12.8 oz)   LMP 05/01/2024 (Exact Date)   BMI 28.61 kg/m²   General:   appears stated age, cooperative, alert normal mood and affect   Neck: normal, supple,trachea midline, no masses   Heart: regular rate and rhythm, S1, S2 normal, no murmur, click, rub or gallop   Lungs: clear to auscultation bilaterally   Breasts: normal appearance, no masses or tenderness, Inspection negative, No nipple retraction or dimpling, No nipple discharge or bleeding, No axillary or supraclavicular adenopathy, Normal to palpation without dominant masses, Taught monthly breast self examination   Abdomen: soft, non-tender, without masses or organomegaly   Vulva: normal female genitalia, Bartholin's, Urethra, [place] normal   Vagina: normal vagina, no discharge, exudate, lesion, or erythema   Urethra: normal   Cervix: " Normal, no discharge. GCC done. Nontender.   Uterus: normal size, contour, position, consistency, mobility, non-tender and anteverted   Adnexa: normal adnexa and no mass, fullness, tenderness   Lymphatic palpation of lymph nodes in neck, axilla, groin and/or other locations: no lymphadenopathy or masses noted   Skin normal skin turgor and no rashes.   Psychiatric orientation to person, place, and time: normal. mood and affect: normal

## 2024-05-30 LAB
C TRACH DNA SPEC QL NAA+PROBE: NEGATIVE
N GONORRHOEA DNA SPEC QL NAA+PROBE: NEGATIVE

## 2024-06-03 ENCOUNTER — TELEPHONE (OUTPATIENT)
Dept: OBGYN CLINIC | Facility: CLINIC | Age: 39
End: 2024-06-03

## 2024-06-03 NOTE — TELEPHONE ENCOUNTER
Called and spoke to patient, informed her of the test results. Patient verbalized understanding, no questions or concerns.

## 2024-06-03 NOTE — TELEPHONE ENCOUNTER
----- Message from CHRISTIANO Dhillon sent at 6/3/2024 11:11 AM EDT -----  Please inform pt that her culture for chlamydia and gonorrhea were negative.  Thank You!

## 2024-06-14 ENCOUNTER — PATIENT OUTREACH (OUTPATIENT)
Dept: FAMILY MEDICINE CLINIC | Facility: CLINIC | Age: 39
End: 2024-06-14

## 2024-06-14 NOTE — PROGRESS NOTES
2nd outreach attempt to contact pt regarding financial assistance through [facility]. Chart review completed. Per chart review, pt's guarantor account reports that a FA application was mailed to pt on 5/24.    OP SWCM placed call to pt w/  #[id number]. OP SWCM had to leave  requesting a call back from pt to address referral needs.    OP SWCM to mail UTR letter to pt.

## 2024-06-14 NOTE — LETTER
06/14/24    Estimado/a Dick Elder un coordinador de la atención médica [facility]  [facility] [place]  [address] [place] [address] [phone]. Intentamos comunicarnos con usted por teléfono varias veces. Es importante que se comunique con nosotros al [phone] para que podamos ofrecerle ayuda con estevan necesidades de atención médica.     Atentamente.     Corazon Knox, STANLEY

## 2024-06-16 PROBLEM — H61.23 BILATERAL IMPACTED CERUMEN: Status: RESOLVED | Noted: 2024-05-17 | Resolved: 2024-06-16

## 2024-06-28 PROBLEM — Z01.419 ENCOUNTER FOR GYNECOLOGICAL EXAMINATION WITHOUT ABNORMAL FINDING: Status: RESOLVED | Noted: 2024-05-29 | Resolved: 2024-06-28

## 2024-07-11 ENCOUNTER — TELEPHONE (OUTPATIENT)
Dept: FAMILY MEDICINE CLINIC | Facility: CLINIC | Age: 39
End: 2024-07-11

## 2024-08-15 ENCOUNTER — OFFICE VISIT (OUTPATIENT)
Dept: FAMILY MEDICINE CLINIC | Facility: CLINIC | Age: 39
End: 2024-08-15

## 2024-08-15 VITALS
TEMPERATURE: 97.8 F | SYSTOLIC BLOOD PRESSURE: 113 MMHG | RESPIRATION RATE: 18 BRPM | WEIGHT: 137.8 LBS | HEIGHT: 60 IN | DIASTOLIC BLOOD PRESSURE: 72 MMHG | BODY MASS INDEX: 27.05 KG/M2 | OXYGEN SATURATION: 98 % | HEART RATE: 58 BPM

## 2024-08-15 DIAGNOSIS — H61.23 BILATERAL IMPACTED CERUMEN: ICD-10-CM

## 2024-08-15 DIAGNOSIS — K92.1 BLOODY STOOL: Primary | ICD-10-CM
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
## 2024-08-15 DIAGNOSIS — K21.9 GASTROESOPHAGEAL REFLUX DISEASE WITHOUT ESOPHAGITIS: ICD-10-CM

## 2024-08-15 RX ORDER — HYDROCORTISONE 10 MG/ML
LOTION TOPICAL 2 TIMES DAILY
Qty: 118 ML | Refills: 1 | Status: SHIPPED | OUTPATIENT
Start: 2024-08-15

## 2024-08-15 NOTE — ASSESSMENT & PLAN NOTE
History of bloody smears on toilet paper when having a bowel movement.  In the last 2 months it has happened 2-3 times.  She states that it is only a smear of blood on the toilet paper, no clots and no blood fills the toilet bowl.  She has intermittent diarrhea and formed stools 3-4 times a day.  No history of hemorrhoids that she is aware of.    Plan:  Used shared decision making today with patient to give her the option to see gastroenterology for further workup which might include a colonoscopy, I also offered her a rectal exam and inspection to look for hemorrhoids that were currently present.  Ultimately she opted for watchful waiting and denied a exam today.  We will continue to monitor the symptoms and refer to GI for possible colonoscopy in the future if symptoms continue

## 2024-08-15 NOTE — PROGRESS NOTES
Ambulatory Visit  Name: Chani Foster      : 1985      MRN: [id number]  Encounter Provider: Sharon Coates DO  Encounter Date: 8/15/2024   Encounter department: [facility]    Assessment & Plan   1. Bloody stool  Assessment & Plan:  History of bloody smears on toilet paper when having a bowel movement.  In the last 2 months it has happened 2-3 times.  She states that it is only a smear of blood on the toilet paper, no clots and no blood fills the toilet bowl.  She has intermittent diarrhea and formed stools 3-4 times a day.  No history of hemorrhoids that she is aware of.    Plan:  Used shared decision making today with patient to give her the option to see gastroenterology for further workup which might include a colonoscopy, I also offered her a rectal exam and inspection to look for hemorrhoids that were currently present.  Ultimately she opted for watchful waiting and denied a exam today.  We will continue to monitor the symptoms and refer to GI for possible colonoscopy in the future if symptoms continue  2. Bug bite, initial encounter  -     hydrocortisone 1 % lotion; Apply topically 2 (two) times a day  3. Bilateral impacted cerumen  -     carbamide peroxide (DEBROX) 6.5 % otic solution; Administer 10 drops into both ears 2 (two) times a day  4. Gastroesophageal reflux disease without esophagitis  -     omeprazole (PriLOSEC) 20 mg delayed release capsule; Take 2 capsules (40 mg total) by mouth daily       History of Present Illness     HPI  39-year-old female presents today for bilateral cerumen disimpaction.  She does tell me that she has been having intermittent bloody smears on toilet paper for the last 2 months.  In the last 2 months it has happened from 2-3 times.  It is only a smear of blood on the toilet paper she denies any clots and no blood fills the toilet bowl.  She has intermittent epigastric pain that is not associated with these  episodes.  She also has intermittent diarrhea and formed stools 3-4 times a day.  No history of hemorrhoids that she is aware of.      Review of Systems   Constitutional:  Negative for chills and fever.   Eyes:  Negative for visual disturbance.   Respiratory:  Negative for cough and shortness of breath.    Cardiovascular:  Negative for chest pain and palpitations.   Gastrointestinal:  Positive for abdominal pain and blood in stool. Negative for vomiting.   Genitourinary:  Negative for dysuria and hematuria.   Musculoskeletal:  Negative for arthralgias and back pain.   Skin:  Negative for color change and rash.   Neurological:  Negative for dizziness and headaches.   All other systems reviewed and are negative.      Objective     /72 (BP Location: Left arm, Patient Position: Sitting, Cuff Size: Standard)   Pulse 58   Temp 97.8 °F (36.6 °C) (Temporal)   Resp 18   Ht 5' (1.524 m)   Wt 62.5 kg (137 lb 12.8 oz)   SpO2 98%   BMI 26.91 kg/m²     Physical Exam  Vitals reviewed.   Constitutional:       Appearance: Normal appearance.   HENT:      Head: Normocephalic and atraumatic.      Right Ear: Tympanic membrane, ear canal and external ear normal. There is impacted cerumen.      Left Ear: Tympanic membrane, ear canal and external ear normal. There is impacted cerumen.      Nose: Nose normal.      Mouth/Throat:      Mouth: Mucous membranes are moist.      Pharynx: Oropharynx is clear.   Eyes:      Conjunctiva/sclera: Conjunctivae normal.   Cardiovascular:      Rate and Rhythm: Normal rate and regular rhythm.      Heart sounds: Normal heart sounds. No murmur heard.     No friction rub. No gallop.   Pulmonary:      Effort: Pulmonary effort is normal.      Breath sounds: No wheezing, rhonchi or rales.   Abdominal:      General: Abdomen is flat. Bowel sounds are normal.      Palpations: Abdomen is soft. There is no mass.      Tenderness: There is no abdominal tenderness.   Musculoskeletal:         General: Normal  range of motion.      Cervical back: Neck supple.   Skin:     General: Skin is warm and dry.   Neurological:      Mental Status: She is alert.       Ear cerumen removal    Date/Time: 8/15/2024 3:00 PM    Performed by: Sharon Coates DO  Authorized by: Sharon Coates DO  Universal Protocol:  Consent given by: patient    Patient location:  Clinic  Procedure details:     Local anesthetic:  None    Location:  L ear and R ear    Procedure type: irrigation with instrumentation      Instrumentation: curette      Approach:  External  Post-procedure details:     Complication:  None    Hearing quality:  Improved    Patient tolerance of procedure:  Tolerated well, no immediate complications  Comments:      Bilateral ear canals effectively disimpacted from cerumen.  Right worse than left at this visit.  Both TMs were visualized and were reflective of light, nonbulging, nonerythematous.      Administrative Statements

## 2024-09-19 ENCOUNTER — OFFICE VISIT (OUTPATIENT)
Dept: FAMILY MEDICINE CLINIC | Facility: CLINIC | Age: 39
End: 2024-09-19

## 2024-09-19 VITALS
DIASTOLIC BLOOD PRESSURE: 78 MMHG | BODY MASS INDEX: 27.68 KG/M2 | TEMPERATURE: 98.2 F | WEIGHT: 141 LBS | RESPIRATION RATE: 18 BRPM | HEIGHT: 60 IN | SYSTOLIC BLOOD PRESSURE: 114 MMHG | OXYGEN SATURATION: 97 % | HEART RATE: 80 BPM

## 2024-09-19 DIAGNOSIS — Z30.41 ENCOUNTER FOR SURVEILLANCE OF CONTRACEPTIVE PILLS: ICD-10-CM

## 2024-09-19 DIAGNOSIS — Z30.019 ENCOUNTER FOR INITIAL PRESCRIPTION OF CONTRACEPTIVES, UNSPECIFIED CONTRACEPTIVE: Primary | ICD-10-CM

## 2024-09-19 LAB — SL AMB POCT URINE HCG: NEGATIVE

## 2024-09-19 PROCEDURE — 99213 OFFICE O/P EST LOW 20 MIN: CPT | Performed by: FAMILY MEDICINE

## 2024-09-19 PROCEDURE — 81025 URINE PREGNANCY TEST: CPT | Performed by: FAMILY MEDICINE

## 2024-09-19 RX ORDER — NORGESTIMATE AND ETHINYL ESTRADIOL 0.25-0.035
1 KIT ORAL DAILY
Qty: 28 TABLET | Refills: 5 | Status: SHIPPED | OUTPATIENT
Start: 2024-09-19

## 2024-09-19 NOTE — PROGRESS NOTES
Ambulatory Visit  Name: Chani Foster      : 1985      MRN: [id number]  Encounter Provider: Sharon Coates DO  Encounter Date: 2024   Encounter department: [facility]    Assessment & Plan  Encounter for initial prescription of contraceptives, unspecified contraceptive  Education given regarding options for contraception, including barrier methods, injectable contraception, IUD placement, oral contraceptives.  Risks and benefits discussed for each method.  Patient ultimately chose oral contraceptives.  Urine pregnancy in the office today was ordered and was negative.   -She denies history of migraine with aura or personal history of blood clots.  She is does not currently smoke tobacco or nicotine products.    Plan:  Sprintec Prescription sent to the pharmacy      Orders:    POCT urine HCG    norgestimate-ethinyl estradiol (Sprintec 28) 0.25-35 MG-MCG per tablet; Take 1 tablet by mouth daily    Encounter for surveillance of contraceptive pills                History of Present Illness     HPI  39-year-old female presents today for a prescription of birth control.  She is sexually active with male partner currently using barrier methods.  Last menses was a month and 10 days ago.  She is currently 10 days late for her menses.  History obtained from : patient  Review of Systems   Constitutional:  Negative for chills and fever.   Eyes:  Negative for visual disturbance.   Respiratory:  Negative for cough and shortness of breath.    Cardiovascular:  Negative for chest pain and palpitations.   Gastrointestinal:  Negative for abdominal pain and vomiting.   Genitourinary:  Negative for dysuria and hematuria.   Musculoskeletal:  Negative for arthralgias and back pain.   Skin:  Negative for color change and rash.   Neurological:  Negative for dizziness and headaches.   All other systems reviewed and are negative.          Objective     /78 (BP Location:  Right arm, Patient Position: Sitting, Cuff Size: Standard)   Pulse 80   Temp 98.2 °F (36.8 °C) (Temporal)   Resp 18   Ht 5' (1.524 m)   Wt 64 kg (141 lb)   SpO2 97%   BMI 27.54 kg/m²     Physical Exam  Vitals reviewed.   Constitutional:       Appearance: Normal appearance.   HENT:      Head: Normocephalic and atraumatic.      Nose: Nose normal.      Mouth/Throat:      Mouth: Mucous membranes are moist.      Pharynx: Oropharynx is clear.   Eyes:      Conjunctiva/sclera: Conjunctivae normal.   Cardiovascular:      Rate and Rhythm: Normal rate and regular rhythm.      Heart sounds: Normal heart sounds. No murmur heard.     No friction rub. No gallop.   Pulmonary:      Effort: Pulmonary effort is normal.      Breath sounds: No wheezing, rhonchi or rales.   Abdominal:      General: Abdomen is flat. Bowel sounds are normal.      Palpations: Abdomen is soft. There is no mass.      Tenderness: There is no abdominal tenderness.   Musculoskeletal:         General: Normal range of motion.      Cervical back: Neck supple.   Skin:     General: Skin is warm and dry.   Neurological:      Mental Status: She is alert.

## 2025-02-06 ENCOUNTER — OFFICE VISIT (OUTPATIENT)
Dept: FAMILY MEDICINE CLINIC | Facility: CLINIC | Age: 40
End: 2025-02-06

## 2025-02-06 VITALS
WEIGHT: 138 LBS | RESPIRATION RATE: 16 BRPM | DIASTOLIC BLOOD PRESSURE: 85 MMHG | OXYGEN SATURATION: 99 % | HEART RATE: 56 BPM | TEMPERATURE: 97.9 F | BODY MASS INDEX: 26.95 KG/M2 | SYSTOLIC BLOOD PRESSURE: 127 MMHG

## 2025-02-06 DIAGNOSIS — Z30.41 ENCOUNTER FOR SURVEILLANCE OF CONTRACEPTIVE PILLS: Primary | ICD-10-CM

## 2025-02-06 DIAGNOSIS — M25.512 ARTHRALGIA OF SHOULDER REGION, LEFT: ICD-10-CM

## 2025-02-06 DIAGNOSIS — M72.2 PLANTAR FASCIITIS OF LEFT FOOT: ICD-10-CM

## 2025-02-06 PROCEDURE — 99213 OFFICE O/P EST LOW 20 MIN: CPT | Performed by: FAMILY MEDICINE

## 2025-02-06 RX ORDER — LIDOCAINE 50 MG/G
1 PATCH TOPICAL DAILY
Qty: 30 PATCH | Refills: 2 | Status: SHIPPED | OUTPATIENT
Start: 2025-02-06

## 2025-02-06 RX ORDER — NAPROXEN 500 MG/1
500 TABLET ORAL 2 TIMES DAILY WITH MEALS
Qty: 60 TABLET | Refills: 3 | Status: SHIPPED | OUTPATIENT
Start: 2025-02-06

## 2025-02-06 NOTE — ASSESSMENT & PLAN NOTE
Patient presents today for surveillance of oral birth control pills.  She initiated these on September 19, 2024. She reports normal menstruation with no side effects.    Plan:  Continue OCPs

## 2025-02-06 NOTE — PROGRESS NOTES
Name: Chani Foster      : 1985      MRN: [id number]  Encounter Provider: Sharon Coates DO  Encounter Date: 2025   Encounter department: [facility] [place]  :  Assessment & Plan  Encounter for surveillance of contraceptive pills  Patient presents today for surveillance of oral birth control pills.  She initiated these on 2024. She reports normal menstruation with no side effects.    Plan:  Continue OCPs         Arthralgia of shoulder region, left  2 months of diffuse shoulder pain, worse in the left shoulder. Started a job at that time with heavy lifting. No limits to ROM.     Assessment:  Tenderness to the left upper trapezius as well as the anterior shoulder near the AC joint.  Differential includes overuse injury and AC joint inflammation    Plan:  Recommend starting naproxen 500 mg twice daily for 14 days and as needed thereafter.  I recommended that she take it with food.  Will also prescribe a Lidoderm patch to apply to her left trapezius nightly  Gave handout on exercises for the shoulder as well as her plantar fasciitis.  I placed a referral for physical therapy but advised patient that if conservative measures with naproxen and at home exercises do not work, she should call physical therapy and establish with their office as well.  I will see her back in 1 month to reassess as well as for her annual physical.  Orders:    naproxen (Naprosyn) 500 mg tablet; Take 1 tablet (500 mg total) by mouth 2 (two) times a day with meals    lidocaine (Lidoderm) 5 %; Apply 1 patch topically over 12 hours daily Remove & Discard patch within 12 hours or as directed by MD    Ambulatory Referral to Physical Therapy; Future    Plantar fasciitis of left foot  Presenting with pain at the left heel, tender to palpation at the insertion of the plantar fascia.     Plan:  Recommend pain control with naproxen and plantar fascia stretching exercises.  Orders:     naproxen (Naprosyn) 500 mg tablet; Take 1 tablet (500 mg total) by mouth 2 (two) times a day with meals    lidocaine (Lidoderm) 5 %; Apply 1 patch topically over 12 hours daily Remove & Discard patch within 12 hours or as directed by MD    Ambulatory Referral to Physical Therapy; Future           History of Present Illness   : [id number].    40 year old woman presenting for follow up regarding birth control She is having no side effects, and hasn't missed any pills. Gets period once a month, normal bleeding amount and lasts 4-5 days. No sexual partners currently, discussed using condoms to prevent against STIs. Denied headaches, abdominal pain, urinary issues, diarrhea, constipation.  She also reports having pain in bilateral shoulders, more intense in left, as well as left heel pain. Denies injury, has 2 months working in job requiring her to lift heavy boxes. Denies limits to ROM, sometimes feels numbness in hands but unsure if it is due to the weather. Has not tried any medication.          Review of Systems   Constitutional:  Negative for chills and fever.   HENT:  Negative for ear pain and sore throat.    Eyes:  Negative for pain and visual disturbance.   Respiratory:  Negative for cough and shortness of breath.    Cardiovascular:  Negative for chest pain.   Gastrointestinal:  Negative for abdominal pain and vomiting.   Genitourinary:  Negative for dysuria and hematuria.   Musculoskeletal:  Positive for arthralgias. Negative for back pain.   All other systems reviewed and are negative.      Objective   /85 (BP Location: Left arm, Patient Position: Sitting, Cuff Size: Standard)   Pulse 56   Temp 97.9 °F (36.6 °C)   Resp 16   Wt 62.6 kg (138 lb)   SpO2 99%   BMI 26.95 kg/m²      Physical Exam  Vitals and nursing note reviewed.   Constitutional:       General: She is not in acute distress.     Appearance: She is well-developed.   HENT:      Head: Normocephalic and atraumatic.   Eyes:       Conjunctiva/sclera: Conjunctivae normal.   Cardiovascular:      Rate and Rhythm: Normal rate and regular rhythm.      Heart sounds: No murmur heard.  Pulmonary:      Effort: Pulmonary effort is normal. No respiratory distress.      Breath sounds: Normal breath sounds.   Abdominal:      Palpations: Abdomen is soft.      Tenderness: There is abdominal tenderness.      Comments: Central tenderness, appears MSK in nature.   Musculoskeletal:         General: Tenderness present. No swelling.      Cervical back: Neck supple.      Comments: Tenderness to palpation at the AC joint  Full shoulder ROM bilaterally  Pain with full abduction of L shoulder   Skin:     General: Skin is warm and dry.      Capillary Refill: Capillary refill takes less than 2 seconds.   Neurological:      Mental Status: She is alert.   Psychiatric:         Mood and Affect: Mood normal.

## 2025-04-07 ENCOUNTER — TELEPHONE (OUTPATIENT)
Dept: FAMILY MEDICINE CLINIC | Facility: CLINIC | Age: 40
End: 2025-04-07